# Patient Record
Sex: MALE | Race: WHITE
[De-identification: names, ages, dates, MRNs, and addresses within clinical notes are randomized per-mention and may not be internally consistent; named-entity substitution may affect disease eponyms.]

---

## 2021-07-24 ENCOUNTER — HOSPITAL ENCOUNTER (OUTPATIENT)
Dept: HOSPITAL 33 - ED | Age: 41
Setting detail: OBSERVATION
LOS: 2 days | Discharge: HOME | End: 2021-07-26
Attending: FAMILY MEDICINE | Admitting: FAMILY MEDICINE
Payer: COMMERCIAL

## 2021-07-24 DIAGNOSIS — I95.9: ICD-10-CM

## 2021-07-24 DIAGNOSIS — R19.7: ICD-10-CM

## 2021-07-24 DIAGNOSIS — R51.9: ICD-10-CM

## 2021-07-24 DIAGNOSIS — R42: ICD-10-CM

## 2021-07-24 DIAGNOSIS — R79.89: ICD-10-CM

## 2021-07-24 DIAGNOSIS — U07.1: Primary | ICD-10-CM

## 2021-07-24 DIAGNOSIS — E86.0: ICD-10-CM

## 2021-07-24 DIAGNOSIS — R43.8: ICD-10-CM

## 2021-07-24 LAB
ALBUMIN SERPL-MCNC: 4 G/DL (ref 3.5–5)
ALP SERPL-CCNC: 56 U/L (ref 38–126)
ALT SERPL-CCNC: 52 U/L (ref 0–50)
ANION GAP SERPL CALC-SCNC: 14.3 MEQ/L (ref 5–15)
AST SERPL QL: 55 U/L (ref 17–59)
BASOPHILS # BLD AUTO: 0 10*3/UL (ref 0–0.4)
BASOPHILS NFR BLD AUTO: 0 % (ref 0–0.4)
BILIRUB BLD-MCNC: 0.2 MG/DL (ref 0.2–1.3)
BLD SMEAR INTERP: YES
BUN SERPL-MCNC: 10 MG/DL (ref 9–20)
CALCIUM SPEC-MCNC: 8.6 MG/DL (ref 8.4–10.2)
CHLORIDE SERPL-SCNC: 96 MMOL/L (ref 98–107)
CO2 SERPL-SCNC: 29 MMOL/L (ref 22–30)
CREAT SERPL-MCNC: 1.11 MG/DL (ref 0.66–1.25)
D DIMER BLD IA.RAPID-MCNC: 1206 NG/ML (ref 215–500)
EOSINOPHIL # BLD AUTO: 0 10*3/UL (ref 0–0.5)
FLUBV AG SPEC QL IA: NEGATIVE
GFR SERPLBLD BASED ON 1.73 SQ M-ARVRAT: > 60 ML/MIN
GLUCOSE SERPL-MCNC: 132 MG/DL (ref 74–106)
GLUCOSE UR-MCNC: NEGATIVE MG/DL
HCT VFR BLD AUTO: 47 % (ref 42–50)
HGB BLD-MCNC: 15.7 GM/DL (ref 12.5–18)
INFLUENZA A: NEGATIVE
INR PPP: 1.14 (ref 0.8–3)
LDH SPEC-CCNC: 245 U/L (ref 120–246)
LYMPHOCYTES # SPEC AUTO: 0.67 10*3/UL (ref 1–4.6)
MAGNESIUM SERPL-MCNC: 2.2 MG/DL (ref 1.6–2.3)
MCH RBC QN AUTO: 28.6 PG (ref 26–32)
MCHC RBC AUTO-ENTMCNC: 33.4 G/DL (ref 32–36)
MONOCYTES # BLD AUTO: 0.24 10*3/UL (ref 0–1.3)
PLATELET # BLD AUTO: 89 K/MM3 (ref 150–450)
POTASSIUM SERPLBLD-SCNC: 4.1 MMOL/L (ref 3.5–5.1)
PROCALCITONIN SERPL-MCNC: 0.09 NG/ML (ref 0.03–0.08)
PROT SERPL-MCNC: 7.1 G/DL (ref 6.3–8.2)
PROT UR STRIP-MCNC: 30 MG/DL
PROTHROMBIN TIME: 13.4 SECONDS (ref 9.4–12.5)
RBC # BLD AUTO: 5.48 M/MM3 (ref 4.1–5.6)
RBC #/AREA URNS HPF: (no result) /HPF (ref 0–2)
SODIUM SERPL-SCNC: 135 MMOL/L (ref 137–145)
WBC # BLD AUTO: 3.6 K/MM3 (ref 4–10.5)
WBC #/AREA URNS HPF: (no result) /HPF (ref 0–5)

## 2021-07-24 PROCEDURE — 99285 EMERGENCY DEPT VISIT HI MDM: CPT

## 2021-07-24 PROCEDURE — 84484 ASSAY OF TROPONIN QUANT: CPT

## 2021-07-24 PROCEDURE — 85379 FIBRIN DEGRADATION QUANT: CPT

## 2021-07-24 PROCEDURE — 36415 COLL VENOUS BLD VENIPUNCTURE: CPT

## 2021-07-24 PROCEDURE — 96360 HYDRATION IV INFUSION INIT: CPT

## 2021-07-24 PROCEDURE — 86140 C-REACTIVE PROTEIN: CPT

## 2021-07-24 PROCEDURE — 71045 X-RAY EXAM CHEST 1 VIEW: CPT

## 2021-07-24 PROCEDURE — 87400 INFLUENZA A/B EACH AG IA: CPT

## 2021-07-24 PROCEDURE — 85027 COMPLETE CBC AUTOMATED: CPT

## 2021-07-24 PROCEDURE — 80053 COMPREHEN METABOLIC PANEL: CPT

## 2021-07-24 PROCEDURE — 85610 PROTHROMBIN TIME: CPT

## 2021-07-24 PROCEDURE — 87086 URINE CULTURE/COLONY COUNT: CPT

## 2021-07-24 PROCEDURE — 81001 URINALYSIS AUTO W/SCOPE: CPT

## 2021-07-24 PROCEDURE — 93041 RHYTHM ECG TRACING: CPT

## 2021-07-24 PROCEDURE — 83615 LACTATE (LD) (LDH) ENZYME: CPT

## 2021-07-24 PROCEDURE — 83605 ASSAY OF LACTIC ACID: CPT

## 2021-07-24 PROCEDURE — 94762 N-INVAS EAR/PLS OXIMTRY CONT: CPT

## 2021-07-24 PROCEDURE — G0378 HOSPITAL OBSERVATION PER HR: HCPCS

## 2021-07-24 PROCEDURE — 93268 ECG RECORD/REVIEW: CPT

## 2021-07-24 PROCEDURE — 93005 ELECTROCARDIOGRAM TRACING: CPT

## 2021-07-24 PROCEDURE — 83735 ASSAY OF MAGNESIUM: CPT

## 2021-07-24 PROCEDURE — 84145 PROCALCITONIN (PCT): CPT

## 2021-07-24 PROCEDURE — 85025 COMPLETE CBC W/AUTO DIFF WBC: CPT

## 2021-07-24 RX ADMIN — DEXAMETHASONE SODIUM PHOSPHATE SCH MG: 4 INJECTION, SOLUTION INTRAMUSCULAR; INTRAVENOUS at 18:45

## 2021-07-24 RX ADMIN — ENOXAPARIN SODIUM SCH MG: 100 INJECTION SUBCUTANEOUS at 16:41

## 2021-07-24 RX ADMIN — ACETAMINOPHEN PRN MG: 500 TABLET ORAL at 14:53

## 2021-07-24 NOTE — XRAY
Indication: Suspect Covid 19.



Comparison: None



Portable apical lordotic chest slightly underinflated and clear.  Heart not

enlarged.  Bony thorax intact.



Impression: Nonacute chest.

## 2021-07-24 NOTE — ERPHSYRPT
- History of Present Illness


Time Seen by Provider: 07/24/21 08:25


Source: patient


Exam Limitations: no limitations


Patient Subjective Stated Complaint: Pt states that he got up off of the toilet 

and passed out


Triage Nursing Assessment: Pt was brought to the ER by his wife, hypoxic, oxygen

has dropped from 95 initically to 88 since arrival, placed on 2L NC, headache, 

body aches, diarrhea, loss of taste and smell, pulses normal, diaphoretic, sinus

rhythm


Physician History: 





Patient is a 41-year-old male who was diagnosed with Covid on Tuesday he has 

been experiencing severe headaches body aches severe diarrhea and a decrease in 

taste and smell as well as cough and shortness of breath.  Today he got up and 

went to the bathroom for a bowel movement and when he stood back up had a 

syncopal episode.  Urine output has decreased.  He denies any injury to his head

with his syncope.  He has had some shortness of breath and does display some 

hypoxia in the ER.


Witnessed: by family


Prior Episodes: single episode today


Precipitating Factors: lightheadedness


Context: standing


Loss of Consciousness: brief (seconds)


Allergies/Adverse Reactions: 








No Known Drug Allergies Allergy (Verified 07/24/21 08:51)


   





Home Medications: 








No Reportable Medications [No Reported Medications]  07/24/21 [History]








Travel Risk





- International Travel


Have you traveled outside of the country in past 3 weeks: No





- Coronavirus Screening


Symptoms: Fever, Cough: New Onset, Shortness of Breath, Vomiting/Diarrhea, Loss 

of Taste or Smell, Headaches/Body Aches/Fatigue


Close contact with a COVID-19 positive Pt in past 14-21 Days: No





- Vaccine Status


Have you recieved a Covid-19 vaccination: No





- Past Medical History


Pertinent Past Medical History: No





- Past Surgical History


Past Surgical History: Yes


Other Surgical History: toe surgery due to accident





- Social History


Smoking Status: Never smoker


Exposure to second hand smoke: No


Drug Use: none





- Review of Systems


Constitutional: Fever, Fatigue, Lethargy, Weakness, Weight Loss


Eyes: No Symptoms


Respiratory: Cough, Dyspnea, Dyspnea on Exertion (MARTINEZ)


Cardiac: No Symptoms


Abdominal/Gastrointestinal: Abdominal Pain, Nausea, Vomiting, Diarrhea


Musculoskeletal: Arthralgias, Back Pain, Joint Pain, Myalgias


Skin: No Symptoms


Neurological: Dizziness, Headache


Psychological: No Symptoms


Endocrine: No Symptoms


Hematologic/Lymphatic: No Symptoms


Immunological/Allergic: No Symptoms


All Other Systems: Reviewed and Negative





Physical Exam





- Nursing Vital Signs


Nursing Vital Signs: 


                               Initial Vital Signs











Temperature  99.7 F   07/24/21 08:19


 


Pulse Rate  91 H  07/24/21 08:19


 


Respiratory Rate  21   07/24/21 08:19


 


Blood Pressure  146/108   07/24/21 08:19


 


O2 Sat by Pulse Oximetry  93 L  07/24/21 08:19








                                   Pain Scale











Pain Intensity                 0

















- Wandy Coma Scale


Best Eye Response (Wandy): (4) open spontaneously


Best Verbal Response (Wandy): (5) oriented


Best Motor Response (Montgomery): (6) obeys commands


[place] Total: 15





- Physical Exam


General Appearance: no apparent distress, alert


Eye Exam: bilateral eye: PERRL, EOMI


Ears, Nose, Throat Exam: normal ENT inspection, pharynx normal, moist mucous 

membranes


Neck Exam: normal inspection, non-tender, supple, full range of motion


Respiratory: lungs clear, respiratory distress (Mild), diminished breath sounds,

 crackles/rales, No chest tenderness


Cardiovascular: regular rate/rhythm, capillary refill <2 sec, No murmur, No 

pulse deficit


Gastrointestinal: soft, normal bowel sounds, tenderness, No distention, No mass


Back Exam: normal inspection, normal range of motion, No CVA tenderness, No 

vertebral tenderness


Extremity Exam: normal inspection, normal range of motion, pelvis stable, No 

tenderness


Mental Status: alert, oriented x 3, cooperative


CNs Exam: normal speech, PERRL, No facial droop


Coordination/Gait: normal finger to nose


Motor/Sensory: no motor deficit, no sensory deficit, no pronator drift


Skin Exam: normal color, warm, dry, No rash


SpO2: 93





- Course


Nursing assessment & vital signs reviewed: Yes


EKG Interpreted by Me: RATE ([id number]), Sinus Rhythm, NORMAL AXIS, NORMAL INTERVALS, 

NORMAL QRS, Non-specific ST Changes





- Radiology Exams


  ** Chest


X-ray Interpretation: Interpreted by me, Other (Questionable faint infiltrate 

left lower lobe)


Ordered Tests: 


                               Active Orders 24 hr











 Category Date Time Status


 


 Cardiac Monitor STAT Care  07/24/21 08:53 Active


 


 EKG-ER Only STAT Care  07/24/21 08:49 Active


 


 IV Insertion STAT Care  07/24/21 08:49 Active


 


 Oxygen-ED Only Nasal Cannula 2 lpm Care  07/24/21 08:49 Active


 


 CHEST 1 VIEW (PORTABLE) Stat Exams  07/24/21 08:53 Taken


 


 CBC W DIFF Stat Lab  07/24/21 10:09 Completed


 


 CMP Stat Lab  07/24/21 10:09 Completed


 


 CULTURE,URINE Stat Lab  07/24/21 09:00 Received


 


 D-DIMER QUANTITATIVE Stat Lab  07/24/21 10:09 Completed


 


 INFLUENZA A+B VINAY Stat Lab  07/24/21 10:09 Completed


 


 LDH-LACTATE DEHYDROGENASE Stat Lab  07/24/21 10:09 Completed


 


 Lactic Acid Stat Lab  07/24/21 08:49 Completed


 


 MAGNESIUM Stat Lab  07/24/21 10:09 Completed


 


 PROCALCITONIN Stat Lab  07/24/21 10:09 Completed


 


 PROTIME WITH INR Stat Lab  07/24/21 10:09 Completed


 


 TROPONIN Q3H Lab  07/24/21 10:09 Completed


 


 TROPONIN Q3H Lab  07/24/21 12:00 Ordered


 


 TROPONIN Q3H Lab  07/24/21 15:00 Ordered


 


 TROPONIN Q3H Lab  07/24/21 18:00 Ordered


 


 TROPONIN Q3H Lab  07/24/21 21:00 Ordered


 


 UA W/RFX UR CULTURE Stat Lab  07/24/21 09:00 Completed








Medication Summary














Discontinued Medications














Generic Name Dose Route Start Last Admin





  Trade Name Freq  PRN Reason Stop Dose Admin


 


Sodium Chloride  Confirm  07/24/21 08:46 





  Sodium Chloride 0.9% 1000 Ml  Administered  07/24/21 08:47 





  Dose  





  1,000 mls @ ud  





  .ROUTE  





  .STK-MED ONE  


 


Sodium Chloride  1,000 mls @ 999 mls/hr  07/24/21 08:49  07/24/21 10:06





  Sodium Chloride 0.9% 1000 Ml  IV  07/24/21 09:49  Infused





  .Q1H1M STA   Infusion


 


Sodium Chloride  1,000 mls @ 999 mls/hr  07/24/21 08:55 





  Sodium Chloride 0.9% 1000 Ml  IV  07/24/21 09:55 





  .Q1H1M STA  


 


Ondansetron HCl  4 mg  07/24/21 08:49 





  Zofran 4 Mg/2 Ml Vial**  IV  07/24/21 08:50 





  STAT ONE  











Lab/Rad Data: 


                           Laboratory Result Diagrams





                                 07/24/21 10:09 





                                 07/24/21 10:09 





                               Laboratory Results











  07/24/21 07/24/21 07/24/21 Range/Units





  10:09 10:09 10:09 


 


WBC     (4.0-10.5)  K/mm3


 


RBC     (4.1-5.6)  M/mm3


 


Hgb     (12.5-18.0)  gm/dl


 


Hct     (42-50)  %


 


MCV     ()  fl


 


MCH     (26-32)  pg


 


MCHC     (32-36)  g/dl


 


RDW     (11.5-14.0)  %


 


Plt Count     (150-450)  K/mm3


 


MPV     (7.5-11.0)  fl


 


Gran %     (36.0-66.0)  %


 


Eos # (Auto)     (0-0.5)  


 


Absolute Lymphs (auto)     (1.0-4.6)  


 


Absolute Monos (auto)     (0.0-1.3)  


 


Lymphocytes %     (24.0-44.0)  %


 


Monocytes %     (0.0-12.0)  %


 


Eosinophils %     (0.00-5.0)  %


 


Basophils %     (0.0-0.4)  %


 


Absolute Granulocytes     (1.4-6.9)  


 


Basophils #     (0-0.4)  


 


PT   13.4 H   (9.4-12.5)  SECONDS


 


INR   1.14   (0.8-3.0)  


 


D-Dimer   1206 H*   (215-500)  ng/mL


 


Sodium    135 L  (137-145)  mmol/L


 


Potassium    4.1  (3.5-5.1)  mmol/L


 


Chloride    96 L  ()  mmol/L


 


Carbon Dioxide    29  (22-30)  mmol/L


 


Anion Gap    14.3  (5-15)  MEQ/L


 


BUN    10  (9-20)  mg/dL


 


Creatinine    1.11  (0.66-1.25)  mg/dL


 


Estimated GFR    > 60.0  ML/MIN


 


Glucose    132 H  ()  mg/dL


 


Lactic Acid     (0.4-2.0)  


 


Calcium    8.6  (8.4-10.2)  mg/dL


 


Magnesium    2.2  (1.6-2.3)  mg/dL


 


Total Bilirubin    0.20  (0.2-1.3)  mg/dL


 


AST    55  (17-59)  U/L


 


ALT    52 H  (0-50)  U/L


 


Alkaline Phosphatase    56  ()  U/L


 


Lactate Dehydrogenase    245  (120-246)  U/L


 


Troponin I  < 0.012    (0.000-0.034)  ng/mL


 


Serum Total Protein    7.1  (6.3-8.2)  g/dL


 


Albumin    4.0  (3.5-5.0)  g/dL


 


Procalcitonin    0.086 H  (0.030-0.080)  ng/mL


 


Urine Color     (YELLOW)  


 


Urine Appearance     (CLEAR)  


 


Urine pH     (5-6)  


 


Ur Specific Gravity     (1.005-1.025)  


 


Urine Protein     (Negative)  


 


Urine Ketones     (NEGATIVE)  


 


Urine Blood     (0-5)  Phillip/ul


 


Urine Nitrite     (NEGATIVE)  


 


Urine Bilirubin     (NEGATIVE)  


 


Urine Urobilinogen     (0-1)  mg/dL


 


Ur Leukocyte Esterase     (NEGATIVE)  


 


Urine WBC (Auto)     (0-5)  /HPF


 


Urine RBC (Auto)     (0-2)  /HPF


 


U Epithel Cells (Auto)     (FEW)  /HPF


 


Urine Bacteria (Auto)     (NEGATIVE)  /HPF


 


Urine Culture Reflexed     (NO)  


 


Urine Glucose     (NEGATIVE)  mg/dL


 


Influenza Type A Ag     (NEGATIVE)  


 


Influenza Type B Ag     (NEGATIVE)  














  07/24/21 07/24/21 07/24/21 Range/Units





  10:09 10:09 09:00 


 


WBC  3.6 L    (4.0-10.5)  K/mm3


 


RBC  5.48    (4.1-5.6)  M/mm3


 


Hgb  15.7    (12.5-18.0)  gm/dl


 


Hct  47.0    (42-50)  %


 


MCV  85.8    ()  fl


 


MCH  28.6    (26-32)  pg


 


MCHC  33.4    (32-36)  g/dl


 


RDW  13.1    (11.5-14.0)  %


 


Plt Count  89 L    (150-450)  K/mm3


 


MPV  10.1    (7.5-11.0)  fl


 


Gran %  74.5 H    (36.0-66.0)  %


 


Eos # (Auto)  0    (0-0.5)  


 


Absolute Lymphs (auto)  0.67 L    (1.0-4.6)  


 


Absolute Monos (auto)  0.24    (0.0-1.3)  


 


Lymphocytes %  18.8 L    (24.0-44.0)  %


 


Monocytes %  6.7    (0.0-12.0)  %


 


Eosinophils %  0.0    (0.00-5.0)  %


 


Basophils %  0.0    (0.0-0.4)  %


 


Absolute Granulocytes  2.66    (1.4-6.9)  


 


Basophils #  0    (0-0.4)  


 


PT     (9.4-12.5)  SECONDS


 


INR     (0.8-3.0)  


 


D-Dimer     (215-500)  ng/mL


 


Sodium     (137-145)  mmol/L


 


Potassium     (3.5-5.1)  mmol/L


 


Chloride     ()  mmol/L


 


Carbon Dioxide     (22-30)  mmol/L


 


Anion Gap     (5-15)  MEQ/L


 


BUN     (9-20)  mg/dL


 


Creatinine     (0.66-1.25)  mg/dL


 


Estimated GFR     ML/MIN


 


Glucose     ()  mg/dL


 


Lactic Acid     (0.4-2.0)  


 


Calcium     (8.4-10.2)  mg/dL


 


Magnesium     (1.6-2.3)  mg/dL


 


Total Bilirubin     (0.2-1.3)  mg/dL


 


AST     (17-59)  U/L


 


ALT     (0-50)  U/L


 


Alkaline Phosphatase     ()  U/L


 


Lactate Dehydrogenase     (120-246)  U/L


 


Troponin I     (0.000-0.034)  ng/mL


 


Serum Total Protein     (6.3-8.2)  g/dL


 


Albumin     (3.5-5.0)  g/dL


 


Procalcitonin     (0.030-0.080)  ng/mL


 


Urine Color    YELLOW  (YELLOW)  


 


Urine Appearance    SLIGHTLY CLOUDY  (CLEAR)  


 


Urine pH    6.0  (5-6)  


 


Ur Specific Gravity    1.016  (1.005-1.025)  


 


Urine Protein    30  (Negative)  


 


Urine Ketones    NEGATIVE  (NEGATIVE)  


 


Urine Blood    SMALL  (0-5)  Phillip/ul


 


Urine Nitrite    NEGATIVE  (NEGATIVE)  


 


Urine Bilirubin    NEGATIVE  (NEGATIVE)  


 


Urine Urobilinogen    NEGATIVE  (0-1)  mg/dL


 


Ur Leukocyte Esterase    NEGATIVE  (NEGATIVE)  


 


Urine WBC (Auto)    3-5  (0-5)  /HPF


 


Urine RBC (Auto)    NONE  (0-2)  /HPF


 


U Epithel Cells (Auto)    NONE  (FEW)  /HPF


 


Urine Bacteria (Auto)    NONE  (NEGATIVE)  /HPF


 


Urine Culture Reflexed    YES  (NO)  


 


Urine Glucose    NEGATIVE  (NEGATIVE)  mg/dL


 


Influenza Type A Ag   NEGATIVE   (NEGATIVE)  


 


Influenza Type B Ag   NEGATIVE   (NEGATIVE)  














  07/24/21 Range/Units





  08:49 


 


WBC   (4.0-10.5)  K/mm3


 


RBC   (4.1-5.6)  M/mm3


 


Hgb   (12.5-18.0)  gm/dl


 


Hct   (42-50)  %


 


MCV   ()  fl


 


MCH   (26-32)  pg


 


MCHC   (32-36)  g/dl


 


RDW   (11.5-14.0)  %


 


Plt Count   (150-450)  K/mm3


 


MPV   (7.5-11.0)  fl


 


Gran %   (36.0-66.0)  %


 


Eos # (Auto)   (0-0.5)  


 


Absolute Lymphs (auto)   (1.0-4.6)  


 


Absolute Monos (auto)   (0.0-1.3)  


 


Lymphocytes %   (24.0-44.0)  %


 


Monocytes %   (0.0-12.0)  %


 


Eosinophils %   (0.00-5.0)  %


 


Basophils %   (0.0-0.4)  %


 


Absolute Granulocytes   (1.4-6.9)  


 


Basophils #   (0-0.4)  


 


PT   (9.4-12.5)  SECONDS


 


INR   (0.8-3.0)  


 


D-Dimer   (215-500)  ng/mL


 


Sodium   (137-145)  mmol/L


 


Potassium   (3.5-5.1)  mmol/L


 


Chloride   ()  mmol/L


 


Carbon Dioxide   (22-30)  mmol/L


 


Anion Gap   (5-15)  MEQ/L


 


BUN   (9-20)  mg/dL


 


Creatinine   (0.66-1.25)  mg/dL


 


Estimated GFR   ML/MIN


 


Glucose   ()  mg/dL


 


Lactic Acid  1.4  (0.4-2.0)  


 


Calcium   (8.4-10.2)  mg/dL


 


Magnesium   (1.6-2.3)  mg/dL


 


Total Bilirubin   (0.2-1.3)  mg/dL


 


AST   (17-59)  U/L


 


ALT   (0-50)  U/L


 


Alkaline Phosphatase   ()  U/L


 


Lactate Dehydrogenase   (120-246)  U/L


 


Troponin I   (0.000-0.034)  ng/mL


 


Serum Total Protein   (6.3-8.2)  g/dL


 


Albumin   (3.5-5.0)  g/dL


 


Procalcitonin   (0.030-0.080)  ng/mL


 


Urine Color   (YELLOW)  


 


Urine Appearance   (CLEAR)  


 


Urine pH   (5-6)  


 


Ur Specific Gravity   (1.005-1.025)  


 


Urine Protein   (Negative)  


 


Urine Ketones   (NEGATIVE)  


 


Urine Blood   (0-5)  Phillip/ul


 


Urine Nitrite   (NEGATIVE)  


 


Urine Bilirubin   (NEGATIVE)  


 


Urine Urobilinogen   (0-1)  mg/dL


 


Ur Leukocyte Esterase   (NEGATIVE)  


 


Urine WBC (Auto)   (0-5)  /HPF


 


Urine RBC (Auto)   (0-2)  /HPF


 


U Epithel Cells (Auto)   (FEW)  /HPF


 


Urine Bacteria (Auto)   (NEGATIVE)  /HPF


 


Urine Culture Reflexed   (NO)  


 


Urine Glucose   (NEGATIVE)  mg/dL


 


Influenza Type A Ag   (NEGATIVE)  


 


Influenza Type B Ag   (NEGATIVE)  














- Progress


Progress: unchanged





- Departure


Departure Disposition: In-patient Admission


Clinical Impression: 


 COVID-19, Dehydration





Condition: Fair


Critical Care Time: No


Referrals: 


DEANDRA BOX [Primary Care Provider] -

## 2021-07-25 LAB
ALBUMIN SERPL-MCNC: 3.7 G/DL (ref 3.5–5)
ALP SERPL-CCNC: 49 U/L (ref 38–126)
ALT SERPL-CCNC: 47 U/L (ref 0–50)
ANION GAP SERPL CALC-SCNC: 14 MEQ/L (ref 5–15)
AST SERPL QL: 49 U/L (ref 17–59)
BASOPHILS # BLD AUTO: 0.01 10*3/UL (ref 0–0.4)
BASOPHILS NFR BLD AUTO: 0.3 % (ref 0–0.4)
BILIRUB BLD-MCNC: 0.3 MG/DL (ref 0.2–1.3)
BLD SMEAR INTERP: YES
BUN SERPL-MCNC: 11 MG/DL (ref 9–20)
CALCIUM SPEC-MCNC: 8.2 MG/DL (ref 8.4–10.2)
CHLORIDE SERPL-SCNC: 100 MMOL/L (ref 98–107)
CO2 SERPL-SCNC: 29 MMOL/L (ref 22–30)
CREAT SERPL-MCNC: 0.87 MG/DL (ref 0.66–1.25)
EOSINOPHIL # BLD AUTO: 0 10*3/UL (ref 0–0.5)
GFR SERPLBLD BASED ON 1.73 SQ M-ARVRAT: > 60 ML/MIN
GLUCOSE SERPL-MCNC: 141 MG/DL (ref 74–106)
HCT VFR BLD AUTO: 44.8 % (ref 42–50)
HGB BLD-MCNC: 14.7 GM/DL (ref 12.5–18)
LYMPHOCYTES # SPEC AUTO: 0.9 10*3/UL (ref 1–4.6)
MCH RBC QN AUTO: 28.4 PG (ref 26–32)
MCHC RBC AUTO-ENTMCNC: 32.8 G/DL (ref 32–36)
MONOCYTES # BLD AUTO: 0.27 10*3/UL (ref 0–1.3)
PLATELET # BLD AUTO: 87 K/MM3 (ref 150–450)
POTASSIUM SERPLBLD-SCNC: 4.5 MMOL/L (ref 3.5–5.1)
PROT SERPL-MCNC: 6.6 G/DL (ref 6.3–8.2)
RBC # BLD AUTO: 5.18 M/MM3 (ref 4.1–5.6)
SODIUM SERPL-SCNC: 138 MMOL/L (ref 137–145)
WBC # BLD AUTO: 3.6 K/MM3 (ref 4–10.5)

## 2021-07-25 RX ADMIN — DEXAMETHASONE SODIUM PHOSPHATE SCH MG: 4 INJECTION, SOLUTION INTRAMUSCULAR; INTRAVENOUS at 17:47

## 2021-07-25 RX ADMIN — DEXAMETHASONE SODIUM PHOSPHATE SCH MG: 4 INJECTION, SOLUTION INTRAMUSCULAR; INTRAVENOUS at 06:47

## 2021-07-25 RX ADMIN — ACETAMINOPHEN PRN MG: 500 TABLET ORAL at 10:20

## 2021-07-25 RX ADMIN — ENOXAPARIN SODIUM SCH MG: 100 INJECTION SUBCUTANEOUS at 16:06

## 2021-07-26 VITALS — OXYGEN SATURATION: 94 % | SYSTOLIC BLOOD PRESSURE: 120 MMHG | DIASTOLIC BLOOD PRESSURE: 88 MMHG

## 2021-07-26 VITALS — HEART RATE: 82 BPM

## 2021-07-26 LAB
ALBUMIN SERPL-MCNC: 3.5 G/DL (ref 3.5–5)
ALP SERPL-CCNC: 44 U/L (ref 38–126)
ALT SERPL-CCNC: 39 U/L (ref 0–50)
ANION GAP SERPL CALC-SCNC: 12 MEQ/L (ref 5–15)
AST SERPL QL: 38 U/L (ref 17–59)
BILIRUB BLD-MCNC: 0.2 MG/DL (ref 0.2–1.3)
BUN SERPL-MCNC: 10 MG/DL (ref 9–20)
CALCIUM SPEC-MCNC: 8.1 MG/DL (ref 8.4–10.2)
CHLORIDE SERPL-SCNC: 101 MMOL/L (ref 98–107)
CO2 SERPL-SCNC: 26 MMOL/L (ref 22–30)
CREAT SERPL-MCNC: 0.81 MG/DL (ref 0.66–1.25)
D DIMER BLD IA.RAPID-MCNC: 670 NG/ML (ref 215–500)
GFR SERPLBLD BASED ON 1.73 SQ M-ARVRAT: > 60 ML/MIN
GLUCOSE SERPL-MCNC: 152 MG/DL (ref 74–106)
HCT VFR BLD AUTO: 42.8 % (ref 42–50)
HGB BLD-MCNC: 14 GM/DL (ref 12.5–18)
INR PPP: 1.13 (ref 0.8–3)
MCH RBC QN AUTO: 28.6 PG (ref 26–32)
MCHC RBC AUTO-ENTMCNC: 32.7 G/DL (ref 32–36)
PLATELET # BLD AUTO: 115 K/MM3 (ref 150–450)
POTASSIUM SERPLBLD-SCNC: 4.2 MMOL/L (ref 3.5–5.1)
PROT SERPL-MCNC: 6.2 G/DL (ref 6.3–8.2)
PROTHROMBIN TIME: 13.3 SECONDS (ref 9.4–12.5)
RBC # BLD AUTO: 4.9 M/MM3 (ref 4.1–5.6)
SODIUM SERPL-SCNC: 135 MMOL/L (ref 137–145)
WBC # BLD AUTO: 4.8 K/MM3 (ref 4–10.5)

## 2021-07-26 RX ADMIN — DEXAMETHASONE SODIUM PHOSPHATE SCH MG: 4 INJECTION, SOLUTION INTRAMUSCULAR; INTRAVENOUS at 05:21

## 2021-07-26 NOTE — HP
CHIEF COMPLAINT:  Lightheadedness, passing out, fatigue, aching all over. No cough. 



HISTORY OF PRESENT ILLNESS:  The patient is a 41 year-old white male who has been sick for 
about six days.  Four days ago he tested positive for COVID. He works in an industry where 
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 
none have had a vaccine. No one in the family has it so far, he states. He was sitting on 
a toilet this morning and passed out. He was brought to the emergency room by his wife. 
His oxygen dropped from 95% down to 88% since his arrival. He said he aches all over, had 
some diarrhea, loss of taste and smell. He was diaphoretic at that time. Normally he is a 
very healthy gentleman. He has never passed out before. 

 

MEDICATIONS:  None routinely. 



ALLERGIES:  NKDA.



REVIEW OF SYSTEMS: The patient had a fever up to 101F, fatigue, lethargy, weakness, a 
little bit of weight loss. Appetite down, cannot taste food. He had some diarrhea, 
vomiting and nausea. NEUROLOGIC: Diffuse headache, worse last night.  



SOCIAL HISTORY:  He does not smoke, not a frequent drinker. . He works a crane at a 
[employer]. 



PHYSICAL EXAMINATION:  

GENERAL: The patient is alert, orientated, well-tanned, healthy looking man. 

VITAL SIGNS:  Temperature 99.7F, pulse 102, respirations 20, blood pressure 100/60. O2 
saturation on 2 liters is 93.     

HEENT:  Pupils equal and reactive to light. 

NECK:  Supple without adenopathy.

CHEST:  Clear.

CVS:  No murmurs or gallops. 

ABDOMEN:  Slightly obese. No masses or organomegaly. 

EXTREMITIES: Normal. He states he has decreased feeling over his feet since he has had 
this. No rashes. 



LAB DATA AND TESTS:  D-dimer positive at 1200 which is pretty markedly elevated. The 
troponins were negative. Electrolytes are normal. His creatinine is down to 3.6. White 
count was normal at 3.6, hemoglobin 15.  



IMPRESSION:  The patient has COVID with elevated D-dimer secondary to COVID, hypotension 
secondary to COVID. He was slightly dehydrated. He is stable on 2 liters. He is on some IV 
fluids, start Remdesivir, Decadron and anticoagulate him. 



PROGNOSIS: Good.